# Patient Record
(demographics unavailable — no encounter records)

---

## 2024-12-01 NOTE — DISCUSSION/SUMMARY
[FreeTextEntry1] : Five year boy found to be rapid strep positive and rapid flu positive (Influenza A). For strep throat, complete 10 days of antibiotics. Use antipyretics as needed. Return for follow up in 2 weeks. After being on antibiotics for at least 24 hours patient less likely to spread infection. For influenza, recommend supportive care including antipyretics, fluids, rest, and nasal saline followed by nasal suction. Discussed risks & benefits of oseltamivir. Will prescribe both medications, and take as prescribed. If worsening symptoms, call back for further evaluation.

## 2024-12-01 NOTE — HISTORY OF PRESENT ILLNESS
[Fever] : FEVER [___ Day(s)] : [unfilled] day(s) [Intermittent] : intermittent [Fatigued] : fatigued [At Night] : at night [Acetaminophen] : acetaminophen [Ibuprofen] : ibuprofen [Change in sleep pattern] : change in sleep pattern [Runny Nose] : runny nose [Nasal Congestion] : nasal congestion [Sore Throat] : sore throat [Decreased Appetite] : decreased appetite [Max Temp: ____] : Max temperature: [unfilled] [Improving] : improving [Known Exposure to COVID-19] : no known exposure to COVID-19 [Hx of recent COVID-19 infection] : no history of recent COVID-19 infection [Headache] : no headache [Eye Redness] : no eye redness [Eye Discharge] : no eye discharge [Ear Pain] : no ear pain [Cough] : no cough [Wheezing] : no wheezing [Vomiting] : no vomiting [Diarrhea] : no diarrhea [Decreased Urine Output] : no decreased urine output [Dysuria] : no dysuria [Rash] : no rash [Loss of taste] : no loss of taste [Loss of smell] : no loss of smell

## 2025-03-19 NOTE — DEVELOPMENTAL MILESTONES
[Normal Development] : Normal Development [None] : none [Cuts most foods with a knife] : cuts most foods with a knife [Is dry day and night] : is dry day and night [Chooses preferred foods] : chooses preferred foods [Starts/continues conversation with peers] : starts/continues conversation with peers [Tells a story with a beginning,] : tells a story with a beginning, a middle, and an end [Masters all consonant sounds and] : masters all consonant sounds and combinations, such as "d" or "ch" [Counts 10 objects] : counts 10 objects [Can do simple addition and] : can do simple addition and subtraction with objects [Rides a standard bike] : rides a standard bike [Hops on one foot 3 to 4 times] : hops on one foot 3 to 4 times [Catches small ball with] : catches small ball with 2 hands [Draw a 12-part person] : draw a 12-part person [Prints 3 or more simple words] : prints 3 or more simple words without copying [Writes first and last name in] : writes first and last name in uppercase or lowercase letters

## 2025-03-19 NOTE — HISTORY OF PRESENT ILLNESS
[Parents] : parents [Vegetables] : vegetables [Fruit] : fruit [Meat] : meat [Grains] : grains [Eggs] : eggs [Fish] : fish [Dairy] : dairy [Normal] : Normal [___ stools per day] : [unfilled]  stools per day [___ voids per day] : [unfilled] voids per day [Toilet Trained] : toilet trained [In own bed] : In own bed [Brushing teeth] : Brushing teeth [Yes] : Patient goes to dentist yearly [Appropiate parent-child-sibling interaction] : Appropriate parent-child-sibling interaction [Toothpaste] : Primary Fluoride Source: Toothpaste [Grade ___] : Grade [unfilled] [No] : Not at  exposure [Water heater temperature set at <120 degrees F] : Water heater temperature set at <120 degrees F [Car seat in back seat] : Car seat in back seat [Carbon Monoxide Detectors] : Carbon monoxide detectors [Smoke Detectors] : Smoke detectors [Supervised outdoor play] : Supervised outdoor play [Exposure to electronic nicotine delivery system] : No exposure to electronic nicotine delivery system [Up to date] : Up to date [de-identified] :  [FreeTextEntry1] :  6 year male brought to the office for Well . Has been doing well, appetite is good, sleeps well, voiding and stooling normally. Growth and development is appropriate for age

## 2025-03-19 NOTE — DISCUSSION/SUMMARY
[FreeTextEntry1] :  Six year old male WELL CHILD.Continue balanced diet with all food groups. Brush teeth twice a day with toothbrush. Recommend visit to dentist. Help child to maintain consistent daily routines and sleep schedule. School discussed. Ensure home is safe. Teach child about personal safety. Use consistent, positive discipline. Limit screen time to no more than 2 hours per day. Encourage physical activity.  Return 1 year for routine well child check.

## 2025-03-19 NOTE — PHYSICAL EXAM
[Alert] : alert [No Acute Distress] : no acute distress [Normocephalic] : normocephalic [Conjunctivae with no discharge] : conjunctivae with no discharge [PERRL] : PERRL [EOMI Bilateral] : EOMI bilateral [Auricles Well Formed] : auricles well formed [Clear Tympanic membranes with present light reflex and bony landmarks] : clear tympanic membranes with present light reflex and bony landmarks [No Discharge] : no discharge [Nares Patent] : nares patent [Pink Nasal Mucosa] : pink nasal mucosa [Palate Intact] : palate intact [Nonerythematous Oropharynx] : nonerythematous oropharynx [Supple, full passive range of motion] : supple, full passive range of motion [No Palpable Masses] : no palpable masses [Symmetric Chest Rise] : symmetric chest rise [Clear to Auscultation Bilaterally] : clear to auscultation bilaterally [Regular Rate and Rhythm] : regular rate and rhythm [Normal S1, S2 present] : normal S1, S2 present [+2 Femoral Pulses] : +2 femoral pulses [No Murmurs] : no murmurs [Soft] : soft [NonTender] : non tender [Non Distended] : non distended [Normoactive Bowel Sounds] : normoactive bowel sounds [No Hepatomegaly] : no hepatomegaly [No Splenomegaly] : no splenomegaly [Testicles Descended Bilaterally] : testicles descended bilaterally [Patent] : patent [No fissures] : no fissures [No Abnormal Lymph Nodes Palpated] : no abnormal lymph nodes palpated [No Gait Asymmetry] : no gait asymmetry [No pain or deformities with palpation of bone, muscles, joints] : no pain or deformities with palpation of bone, muscles, joints [Normal Muscle Tone] : normal muscle tone [Straight] : straight [+2 Patella DTR] : +2 patella DTR [Cranial Nerves Grossly Intact] : cranial nerves grossly intact [No Rash or Lesions] : no rash or lesions

## 2025-04-23 NOTE — DISCUSSION/SUMMARY
[FreeTextEntry1] :  Six year old male with seasonal allergic rhinitis and allergic conjunctivitis. Will start on Zyrtec/ Flonase at night and also Pataday in both eyes.

## 2025-04-23 NOTE — PHYSICAL EXAM
[Conjuctival Injection] : conjunctival injection [Increased Tearing] : increased tearing [Eyelid Swelling] : eyelid swelling [Bilateral] : (bilateral) [Clear Rhinorrhea] : clear rhinorrhea [Inflamed Nasal Mucosa] : inflamed nasal mucosa [NL] : warm, clear [FreeTextEntry4] : swollen turbinates [FreeTextEntry7] : no wheezing

## 2025-04-23 NOTE — HISTORY OF PRESENT ILLNESS
[FreeTextEntry6] :  Six year old male brought to the office because of redness in eyes with lots of itching. Both eyelids are also puffy. He has a history of allergies in the past.